# Patient Record
Sex: FEMALE | Race: AMERICAN INDIAN OR ALASKA NATIVE
[De-identification: names, ages, dates, MRNs, and addresses within clinical notes are randomized per-mention and may not be internally consistent; named-entity substitution may affect disease eponyms.]

---

## 2019-03-02 ENCOUNTER — HOSPITAL ENCOUNTER (INPATIENT)
Dept: HOSPITAL 5 - TRG | Age: 19
LOS: 2 days | Discharge: HOME | End: 2019-03-04
Attending: OBSTETRICS & GYNECOLOGY | Admitting: OBSTETRICS & GYNECOLOGY
Payer: MEDICAID

## 2019-03-02 DIAGNOSIS — Z3A.40: ICD-10-CM

## 2019-03-02 LAB
BASOPHILS # (AUTO): 0.1 K/MM3 (ref 0–0.1)
BASOPHILS NFR BLD AUTO: 0.7 % (ref 0–1.8)
EOSINOPHIL # BLD AUTO: 0 K/MM3 (ref 0–0.4)
EOSINOPHIL NFR BLD AUTO: 0.2 % (ref 0–4.3)
HCT VFR BLD CALC: 33.8 % (ref 30.3–42.9)
HCT VFR BLD CALC: 38.9 % (ref 30.3–42.9)
HGB BLD-MCNC: 11.6 GM/DL (ref 10.1–14.3)
HGB BLD-MCNC: 13.4 GM/DL (ref 10.1–14.3)
LYMPHOCYTES # BLD AUTO: 1.5 K/MM3 (ref 1.2–5.4)
LYMPHOCYTES NFR BLD AUTO: 17.1 % (ref 13.4–35)
MCHC RBC AUTO-ENTMCNC: 35 % (ref 30–34)
MCV RBC AUTO: 94 FL (ref 79–97)
MONOCYTES # (AUTO): 0.8 K/MM3 (ref 0–0.8)
MONOCYTES % (AUTO): 9.5 % (ref 0–7.3)
PLATELET # BLD: 228 K/MM3 (ref 140–440)
RBC # BLD AUTO: 4.14 M/MM3 (ref 3.65–5.03)

## 2019-03-02 PROCEDURE — 86850 RBC ANTIBODY SCREEN: CPT

## 2019-03-02 PROCEDURE — 10907ZC DRAINAGE OF AMNIOTIC FLUID, THERAPEUTIC FROM PRODUCTS OF CONCEPTION, VIA NATURAL OR ARTIFICIAL OPENING: ICD-10-PCS | Performed by: OBSTETRICS & GYNECOLOGY

## 2019-03-02 PROCEDURE — 36415 COLL VENOUS BLD VENIPUNCTURE: CPT

## 2019-03-02 PROCEDURE — 85025 COMPLETE CBC W/AUTO DIFF WBC: CPT

## 2019-03-02 PROCEDURE — 86901 BLOOD TYPING SEROLOGIC RH(D): CPT

## 2019-03-02 PROCEDURE — 59025 FETAL NON-STRESS TEST: CPT

## 2019-03-02 PROCEDURE — 85018 HEMOGLOBIN: CPT

## 2019-03-02 PROCEDURE — 86900 BLOOD TYPING SEROLOGIC ABO: CPT

## 2019-03-02 PROCEDURE — 85014 HEMATOCRIT: CPT

## 2019-03-02 PROCEDURE — 3E0R3BZ INTRODUCTION OF ANESTHETIC AGENT INTO SPINAL CANAL, PERCUTANEOUS APPROACH: ICD-10-PCS | Performed by: OBSTETRICS & GYNECOLOGY

## 2019-03-02 PROCEDURE — 00HU33Z INSERTION OF INFUSION DEVICE INTO SPINAL CANAL, PERCUTANEOUS APPROACH: ICD-10-PCS | Performed by: OBSTETRICS & GYNECOLOGY

## 2019-03-02 RX ADMIN — IBUPROFEN SCH MG: 600 TABLET, FILM COATED ORAL at 18:00

## 2019-03-02 RX ADMIN — DOCUSATE SODIUM SCH MG: 100 CAPSULE, LIQUID FILLED ORAL at 22:34

## 2019-03-02 RX ADMIN — SODIUM CHLORIDE, SODIUM LACTATE, POTASSIUM CHLORIDE, AND CALCIUM CHLORIDE SCH MLS/HR: .6; .31; .03; .02 INJECTION, SOLUTION INTRAVENOUS at 05:47

## 2019-03-02 RX ADMIN — SODIUM CHLORIDE, SODIUM LACTATE, POTASSIUM CHLORIDE, AND CALCIUM CHLORIDE SCH MLS/HR: .6; .31; .03; .02 INJECTION, SOLUTION INTRAVENOUS at 05:25

## 2019-03-02 NOTE — HISTORY AND PHYSICAL REPORT
History of Present Illness


Date of examination: 19


Date of admission: 


19 04:34





Chief complaint: 





I'm having contractions


History of present illness: 





Patient is a 18 year old  who presents at 40.4 weeks in active labor. 

Patient has had an unremarkable prenatal course. She transferred to [facility]

when she moved from [place] about 6 weeks ago. 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 

Valtrex since 37 weeks.





Past History


Past Medical History: no pertinent history


Past Surgical History: no surgical history


GYN History: herpes


Social history: 





- Obstetrical History


Expected Date of Delivery: 19


Actual Gestation: 40 Week(s) 5 Day(s) 


: 1


Para: 0


Number of Living Children: 0





Medications and Allergies


                                    Allergies











Allergy/AdvReac Type Severity Reaction Status Date / Time


 


No Known Allergies Allergy   Verified 19 05:02











                                Home Medications











 Medication  Instructions  Recorded  Confirmed  Last Taken  Type


 


Ferrous Sulfate [Iron] 325 mg PO QDAY 19 History


 


Pnv No.95/Ferrous Fum/Folic AC 1 each PO QDAY 19 History





[Prenatal Vitamin Tablet]     











Active Meds: 


Active Medications





Butorphanol Tartrate (Stadol)  1 mg IV Q2H PRN


   PRN Reason: Labor Pain


Ephedrine Sulfate (Ephedrine Sulfate)  10 mg IV Q2M PRN


   PRN Reason: Hypotension


Ampicillin Sodium (Ampicillin/Ns 1 Gm/50 Ml)  1 gm in 50 mls @ 100 mls/hr IV 

Q4HR SYED; Protocol


Lactated Ringer's (Lactated Ringers)  1,000 mls @ 125 mls/hr IV AS DIRECT SYED


   Last Admin: 19 05:47 Dose:  1,000 mls/hr


   Documented by: 


Oxytocin/Sodium Chloride (Pitocin/Ns 20 Unit/1000ml Drip)  20 units in 1,000 mls

@ 125 mls/hr IV AS DIRECT SYED


Fentanyl/Bupivacaine/Sodium Chlor (Fentanyl-Bupiv 2 Mcg/Ml-0.125%)  200 mcg in 

100 mls @ 12 mls/hr EPIDURAL TITR SYED; Protocol


   Last Admin: 19 07:52 Dose:  12 mls/hr


   Documented by: 


Naloxone HCl (Narcan 2 Mg/2 Ml)  0.2 mg IV Q5M PRN


   PRN Reason: Respiratory sedation











Review of Systems


All systems: negative


Gastrointestinal: abdominal pain


Genitourinary: leakage of fluid, contractions





- Vital Signs


Vital signs: 


                                   Vital Signs











Temp Pulse Resp BP


 


 98.6 F   87   16   117/73 


 


 19 04:09  19 04:09  19 04:09  19 04:09








                                        











Temp Pulse Resp BP Pulse Ox


 


 98.4 F   101 H  24   118/68   96 


 


 19 05:11  19 10:29  19 05:11  19 10:29  19 07:55














- Physical Exam


Breasts: Positive: deferred


Cardiovascular: Regular rate, Normal S1, Normal S2


Lungs: Positive: Clear to auscultation, Normal air movement


Abdomen: Positive: normal appearance, soft, normal bowel sounds


Genitourinary (Female): Positive: normal external genitalia, normal perenium


Vulva: both: normal


Vagina: Positive: normal moisture


Uterus: Positive: normal size, enlarged, normal contour





- Obstetrical


FHR: auscultation normal


Uterine Contraction Monitor Mode: External


Cervical Dilatation: 4


Cervical Effacement Percentage: 80


Fetal station: 0


Uterine Contraction Pattern: Regular


Uterine Contraction Intensity: Strong/Firm





Results


Result Diagrams: 


                                 19 05:15





                              Abnormal lab results











  19 Range/Units





  05:15 


 


MCHC  35 H  (30-34)  %


 


Mono % (Auto)  9.5 H  (0.0-7.3)  %


 


Seg Neutrophils %  72.5 H  (40.0-70.0)  %








All other labs normal.








Assessment and Plan





IUP at 40.4 in active labor. Admit for labor management. AROM when needed. 

Epidural when needed. Anticipate .

## 2019-03-02 NOTE — ANESTHESIA DAY OF SURGERY
Anesthesia Day of Surgery





- Day of Surgery


Patient Examined: Yes


Patient H&P Reviewed: Yes


Patient is NPO: Yes


Beta Blockers: No


Cardiac Clearance: No


Pulmonary Clearance: No


Ej's Test: N/A

## 2019-03-02 NOTE — ANESTHESIA CONSULTATION
Anesthesia Consult and Med Hx





- Airway


Anesthetic Teeth Evaluation: Good


ROM Head & Neck: Adequate


Mental/Hyoid Distance: Adequate


Mallampati Class: Class I


Intubation Access Assessment: Good





- Pulmonary Exam


CTA: Yes





- Cardiac Exam


Cardiac Exam: RRR





- Pre-Operative Health Status


ASA Pre-Surgery Classification: ASA2


Proposed Anesthetic Plan: Epidural





- Pulmonary


Hx Smoking: No


Hx Asthma: No


Hx Respiratory Symptoms: No


SOB: No


COPD: No


Home Oxygen Therapy: No


Hx Pneumonia: No





- Cardiovascular System


Hx Hypertension: No





- Central Nervous System


Hx Seizures: No


Hx Psychiatric Problems: No





- Endocrine


Hx Renal Disease: No


Hx End Stage Renal Disease: No


Hx Hypothyroidism: No


Hx Hyperthyroidism: No





- Hematic


Hx Anemia: No


Hx Sickle Cell Disease: No





- Other Systems


Hx Alcohol Use: No

## 2019-03-03 RX ADMIN — DOCUSATE SODIUM SCH MG: 100 CAPSULE, LIQUID FILLED ORAL at 11:07

## 2019-03-03 RX ADMIN — IBUPROFEN SCH MG: 600 TABLET, FILM COATED ORAL at 06:06

## 2019-03-03 RX ADMIN — IBUPROFEN SCH MG: 600 TABLET, FILM COATED ORAL at 18:11

## 2019-03-03 RX ADMIN — IBUPROFEN SCH MG: 600 TABLET, FILM COATED ORAL at 12:03

## 2019-03-03 NOTE — PROGRESS NOTE
Assessment and Plan





PPD 1 s/p . Doing well. Plan for discharge today.





Subjective





- Subjective


Date of service: 19


Interval history: 





Patient is a 18 year old  who presents at 40.4 weeks in active labor. 

Patient has had an unremarkable prenatal course. She transferred to [facility]

when she moved from [place] about 6 weeks ago. 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 

Valtrex since 37 weeks.


Patient reports: appetite normal, voiding normally, pain well controlled, 

ambulating normally


: doing well





Objective





- Vital Signs


Latest vital signs: 


                                   Vital Signs











  Temp Pulse Resp BP BP Pulse Ox


 


 19 08:03  98.4 F  82  18  101/64   95


 


 19 06:06    18   


 


 19 00:55  98.5 F  96 H  18  101/56   97


 


 19 21:19  98.6 F  86  18  116/73   97


 


 19 15:56  98.5 F  77  18   109/59  96








                                Intake and Output











 19





 06:59 14:59 22:59


 


Intake Total 240 120 


 


Balance 240 120 


 


Intake:   


 


  Oral  120 


 


  Intake, Free Water 240  


 


Other:   


 


  Total, Intake Amount  120 


 


  # Voids   


 


    Void 2  














- Exam


Breasts: Present: deferred


Cardiovascular: Present: Regular rate, Normal S1, Normal S2


Lungs: Present: Clear to auscultation, Normal air movement


Abdomen: Present: normal appearance, soft, normal bowel sounds


Vulva: both: normal


Uterus: Present: normal, firm


Extremities: Present: normal


Deep Tendon Reflex Grade: Normal +2

## 2019-03-03 NOTE — DISCHARGE SUMMARY
Providers





- Providers


Date of Admission: 


19 04:34





Date of discharge: 19


Attending physician: 


SEKOU LÓPEZ





Primary care physician: 


SEKOU LÓPEZ








Hospitalization


Reason for admission: active labor


Delivery: 


Episiotomy: none


Laceration: none


Postpartum complications: none


Discharge diagnosis: IUP at term delivered


[place] baby: male


Hospital course: 





unremarkable


Condition at discharge: Good


Disposition: DC-01 TO HOME OR SELFCARE





Plan





- Discharge Medications


Prescriptions: 


HYDROcodone/APAP 5-325 [Arrey 5/325] 1 each PO Q6HR PRN #25 tablet


 PRN Reason: Pain


Ibuprofen [Motrin] 800 mg PO Q8HR PRN #40 tablet


 PRN Reason: Pain, Moderate (4-6)





- Provider Discharge Summary


Activity: routine, no sex for 6 weeks, no heavy lifting 4 weeks, no strenuous 

exercise


Diet: routine


Instructions: routine


Additional instructions: 


[]  Smoking cessation referral if applicable(refer to patient education folder 

for contact #)


[]  Refer to [facility] Booklet








Call your doctor immediately for:


* Fever > 100.5


* Heavy vaginal bleeding ( >1 pad per hour)


* Severe persistent headache


* Shortness of breath


* Reddened, hot, painful area to leg or breast


* Drainage or odor from incision.





* Keep incision clean and dry at all times and follow doctor's instructions 

regarding bathing/showering











- Follow up plan


Follow up: 


SEKOU LÓPEZ MD [Primary Care Provider] - 6 Weeks

## 2019-03-04 VITALS — DIASTOLIC BLOOD PRESSURE: 70 MMHG | SYSTOLIC BLOOD PRESSURE: 121 MMHG
